# Patient Record
Sex: MALE | Race: WHITE | NOT HISPANIC OR LATINO | URBAN - METROPOLITAN AREA
[De-identification: names, ages, dates, MRNs, and addresses within clinical notes are randomized per-mention and may not be internally consistent; named-entity substitution may affect disease eponyms.]

---

## 2022-10-25 ENCOUNTER — EMERGENCY (EMERGENCY)
Facility: HOSPITAL | Age: 32
LOS: 1 days | Discharge: HOME | End: 2022-10-25
Attending: EMERGENCY MEDICINE | Admitting: EMERGENCY MEDICINE

## 2022-10-25 VITALS
SYSTOLIC BLOOD PRESSURE: 123 MMHG | RESPIRATION RATE: 15 BRPM | HEART RATE: 62 BPM | TEMPERATURE: 98 F | OXYGEN SATURATION: 99 % | WEIGHT: 149.91 LBS | HEIGHT: 77 IN | DIASTOLIC BLOOD PRESSURE: 76 MMHG

## 2022-10-25 PROCEDURE — 99053 MED SERV 10PM-8AM 24 HR FAC: CPT

## 2022-10-25 PROCEDURE — 99284 EMERGENCY DEPT VISIT MOD MDM: CPT

## 2022-10-25 RX ORDER — DIPHENHYDRAMINE HCL 50 MG
25 CAPSULE ORAL ONCE
Refills: 0 | Status: COMPLETED | OUTPATIENT
Start: 2022-10-25 | End: 2022-10-25

## 2022-10-25 RX ADMIN — Medication 25 MILLIGRAM(S): at 01:43

## 2022-10-25 NOTE — ED ADULT TRIAGE NOTE - CHIEF COMPLAINT QUOTE
Pt brought in by EMS after pt experiences itching to his arms, behind his ears and throat and chest tightness about 45 min after taking a tylenol extra strength. Chest tightness has since resolved. Denies any shortness of breath. Denies throat swelling.

## 2022-10-25 NOTE — ED ADULT NURSE NOTE - OBJECTIVE STATEMENT
Pt AAOx4, took 2 OTC Excedrin a couple hours ago, states approx. 30 mins after taking it he had CP, SOB and itchy throat. Now c/o feeling disoriented, dry, itchy throat and SOB. Denies any CP, n/v/d. Pt states he has taken Tylenol in the past so thinks he may be having a reaction to ASA. NAD noted.

## 2022-10-25 NOTE — ED PROVIDER NOTE - PATIENT PORTAL LINK FT
You can access the FollowMyHealth Patient Portal offered by [facility] by registering at the following website: [email or web address]. By joining [employer]’s FollowMyHealth portal, you will also be able to view your health information using other applications (apps) compatible with our system.

## 2022-10-25 NOTE — ED ADULT NURSE NOTE - CHPI ED NUR SYMPTOMS NEG
no congestion/no difficulty swallowing/no nausea/no rash/no swelling of face, tongue/no vomiting/no wheezing

## 2022-10-25 NOTE — ED PROVIDER NOTE - OBJECTIVE STATEMENT
31-year-old male with no past medical history who presents with itchiness to his tongue mouth after taking an Excedrin over-the-counter for a headache.  Patient notes that he is taking Tylenol and ibuprofen in the past with no issues and the Excedrin he noticed had aspirin in it which she does not take.  Patient otherwise denies hives, denies wheezing or stridor, denies any other swelling.  Patient notes that he is feeling much better this occurred at 11 PM and now is feeling almost resolved symptoms.  Patient did not take medications prior to arrival.  Denies medication or food allergies in his past

## 2022-10-25 NOTE — ED PROVIDER NOTE - CLINICAL SUMMARY MEDICAL DECISION MAKING FREE TEXT BOX
Presents status post duration of Tylenol aspirin and caffeine which is a formulation and Excedrin and subsequently started to develop tingling in itching to mouth and face.  Symptoms now resolved will give Benadryl physical exam no signs of angioedema or respiratory distress or hives.  Patient counseled on not taking aspirin understands plan has no questions appears well
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
## 2022-10-28 DIAGNOSIS — L29.9 PRURITUS, UNSPECIFIED: ICD-10-CM

## 2022-10-28 DIAGNOSIS — T39.015A ADVERSE EFFECT OF ASPIRIN, INITIAL ENCOUNTER: ICD-10-CM
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
## 2022-11-10 NOTE — ED ADULT TRIAGE NOTE - BMI (KG/M2)
Procedure(s):  ESOPHAGOGASTRODUODENOSCOPY (EGD)  ESOPHAGOGASTRODUODENAL (EGD) BIOPSY. MAC    Anesthesia Post Evaluation      Multimodal analgesia: multimodal analgesia used between 6 hours prior to anesthesia start to PACU discharge  Patient location during evaluation: PACU  Level of consciousness: sleepy but conscious  Pain management: adequate  Airway patency: patent  Anesthetic complications: no  Cardiovascular status: acceptable  Respiratory status: acceptable  Hydration status: acceptable  Comments: +Post-Anesthesia Evaluation and Assessment    Patient: Ammy Brewer MRN: [id number]  SSN: [id number]   YOB: 1945  Age: 76 y.o. Sex: male      Cardiovascular Function/Vital Signs    BP (!) 119/56   Pulse 78   Temp 36.6 °C (97.8 °F)   Resp 17   Ht 6' 2\" (1.88 m)   Wt 102.1 kg (225 lb 1.6 oz)   SpO2 99%   BMI 28.90 kg/m²     Patient is status post Procedure(s):  ESOPHAGOGASTRODUODENOSCOPY (EGD)  ESOPHAGOGASTRODUODENAL (EGD) BIOPSY. Nausea/Vomiting: Controlled. Postoperative hydration reviewed and adequate. Pain:  Pain Scale 1: Numeric (0 - 10) (11/10/22 1112)  Pain Intensity 1: 0 (11/10/22 1112)   Managed. Neurological Status: At baseline. Mental Status and Level of Consciousness: Arousable. Pulmonary Status:   O2 Device: None (Room air) (11/10/22 1109)   Adequate oxygenation and airway patent. Complications related to anesthesia: None    Post-anesthesia assessment completed. No concerns. Signed By: Dalton Lamb MD    11/10/2022  Post anesthesia nausea and vomiting:  controlled  Final Post Anesthesia Temperature Assessment:  Normothermia (36.0-37.5 degrees C)      INITIAL Post-op Vital signs:   Vitals Value Taken Time   /64 11/10/22 1112   Temp 36.6 °C (97.8 °F) 11/10/22 1055   Pulse 76 11/10/22 1112   Resp 26 11/10/22 1112   SpO2 99 % 11/10/22 1112   Vitals shown include unvalidated device data.
17.8

## 2024-10-15 NOTE — ED ADULT TRIAGE NOTE - LOCATION:
Stephanie L Schoenleber, MD  [facility]  10/15/24 12:02 PM      
Left arm;